# Patient Record
Sex: FEMALE | Race: WHITE | HISPANIC OR LATINO | URBAN - METROPOLITAN AREA
[De-identification: names, ages, dates, MRNs, and addresses within clinical notes are randomized per-mention and may not be internally consistent; named-entity substitution may affect disease eponyms.]

---

## 2022-08-29 ENCOUNTER — OFFICE VISIT (OUTPATIENT)
Dept: URBAN - METROPOLITAN AREA CLINIC 66 | Facility: CLINIC | Age: 87
End: 2022-08-29

## 2022-08-29 RX ORDER — HYDROCHLOROTHIAZIDE 12.5 MG/1
TABLET ORAL
Qty: 90 TABLET | Status: ACTIVE | COMMUNITY

## 2022-08-29 RX ORDER — SIMVASTATIN 20 MG/1
TOME UNA TABLETA TODOS LOS D AS TABLET, FILM COATED ORAL
Qty: 90 EACH | Refills: 0 | Status: ACTIVE | COMMUNITY

## 2022-08-29 RX ORDER — FUROSEMIDE 20 MG/1
TABLET ORAL
Qty: 36 TABLET | Status: ACTIVE | COMMUNITY

## 2022-08-29 RX ORDER — POTASSIUM CHLORIDE 1500 MG/1
TABLET, FILM COATED, EXTENDED RELEASE ORAL
Qty: 90 TABLET | Status: ACTIVE | COMMUNITY

## 2022-08-29 NOTE — HPI-MIGRATED HPI
Transition of Care : Patient evaluated due to constipation/abdominal distention. Was recently treated at [facility] and had CT scan that was found with a colon mass.  Has history of chronic constipation.  Denies nausea, vomits, dysphagia, odynophagia, heartburn, abdominal pain, diarrhea, GI bleeding or weight loss

## 2022-08-29 NOTE — EXAM-MIGRATED EXAMINATIONS
General Examination: Rectal: -> not examined    Extremities: -> normal extremity with no clubbing, cyanosis or edema   Neurologic: -> alert and oriented, normal exam with no motor or sensory deficits   Lymph nodes: ->    Skin: -> skin is warm and dry, with no rashes, good skin turgor and normal hair distribution, with no suspicious skin lesions   Heart: -> regular rate and rhythm without murmurs, gallops, clicks or rubs   Lungs: -> clear to auscultation bilaterally, with good air movement and no rales, rhonchi or wheezes   Breasts: ->    Abdomen: -> soft with good bowel sounds, nontender, and no masses or hepatosplenomegaly   General appearance: -> alert, pleasant, well-nourished and in no acute distress   Head: -> normocephalic, atraumatic   Eyes: -> pupils equal, round, reactive to light and accommodation, sclera anicteric   Ears: -> normal   Oral cavity: -> mucosa moist   Neck / thyroid: ->

## 2022-09-06 ENCOUNTER — TELEPHONE ENCOUNTER (OUTPATIENT)
Dept: URBAN - METROPOLITAN AREA CLINIC 68 | Facility: CLINIC | Age: 87
End: 2022-09-06

## 2022-09-26 ENCOUNTER — TELEPHONE ENCOUNTER (OUTPATIENT)
Dept: URBAN - METROPOLITAN AREA CLINIC 68 | Facility: CLINIC | Age: 87
End: 2022-09-26

## 2022-09-26 ENCOUNTER — OFFICE VISIT (OUTPATIENT)
Dept: URBAN - METROPOLITAN AREA CLINIC 66 | Facility: CLINIC | Age: 87
End: 2022-09-26

## 2022-09-26 RX ORDER — FUROSEMIDE 20 MG/1
TABLET ORAL
Qty: 36 TABLET | Status: ACTIVE | COMMUNITY

## 2022-09-26 RX ORDER — HYDROCHLOROTHIAZIDE 12.5 MG/1
TABLET ORAL
Qty: 90 TABLET | Status: ACTIVE | COMMUNITY

## 2022-09-26 RX ORDER — SIMVASTATIN 20 MG/1
TOME UNA TABLETA TODOS LOS D AS TABLET, FILM COATED ORAL
Qty: 90 EACH | Refills: 0 | Status: ACTIVE | COMMUNITY

## 2022-09-26 RX ORDER — POTASSIUM CHLORIDE 1500 MG/1
TABLET, FILM COATED, EXTENDED RELEASE ORAL
Qty: 90 TABLET | Status: ACTIVE | COMMUNITY

## 2022-09-26 NOTE — HPI-MIGRATED HPI
Transition of Care : Patient evaluated after having recent CT scan at [facility] reportd with possible colonic lesion.  Patient consented for video-audio encounter using [employer] Complaint ascencion. Currently having constipation problems.  Denies nausea, vomits, dysphagia, odynophagia, heartburn, abdominal pain, diarrhea,  GI bleeding or weight loss

## 2022-10-21 ENCOUNTER — OFFICE VISIT (OUTPATIENT)
Dept: URBAN - METROPOLITAN AREA CLINIC 68 | Facility: CLINIC | Age: 87
End: 2022-10-21

## 2022-10-21 ENCOUNTER — TELEPHONE ENCOUNTER (OUTPATIENT)
Dept: URBAN - METROPOLITAN AREA CLINIC 68 | Facility: CLINIC | Age: 87
End: 2022-10-21

## 2022-10-21 RX ORDER — POTASSIUM CHLORIDE 1500 MG/1
TABLET, FILM COATED, EXTENDED RELEASE ORAL
Qty: 90 TABLET | Status: ACTIVE | COMMUNITY

## 2022-10-21 RX ORDER — FUROSEMIDE 20 MG/1
TABLET ORAL
Qty: 36 TABLET | Status: ACTIVE | COMMUNITY

## 2022-10-21 RX ORDER — SIMVASTATIN 20 MG/1
TOME UNA TABLETA TODOS LOS D AS TABLET, FILM COATED ORAL
Qty: 90 EACH | Refills: 0 | Status: ACTIVE | COMMUNITY

## 2022-10-21 RX ORDER — HYDROCHLOROTHIAZIDE 12.5 MG/1
TABLET ORAL
Qty: 90 TABLET | Status: ACTIVE | COMMUNITY

## 2022-10-21 NOTE — HPI-MIGRATED HPI
Transition of Care : Patient evaluated due to constipation.  Patient and daughter consented for video-audio encounter using [email or web address] Complaint ascencion. Was recently treated at [facility] due to acute constipation. Was treated for volume depletion.  Referred continues with constipation problems and bloody mucosity.   Referred had negative CT scan compared to previous one.  Denies nausea, vomits, dysphagia, odynophagia, heartburn, abdominal pain, diarrhea, constipation GI bleeding or weight loss

## 2022-10-27 ENCOUNTER — TELEPHONE ENCOUNTER (OUTPATIENT)
Dept: URBAN - METROPOLITAN AREA CLINIC 68 | Facility: CLINIC | Age: 87
End: 2022-10-27

## 2022-11-10 ENCOUNTER — DASHBOARD ENCOUNTERS (OUTPATIENT)
Age: 87
End: 2022-11-10

## 2022-11-10 ENCOUNTER — OFFICE VISIT (OUTPATIENT)
Dept: URBAN - METROPOLITAN AREA CLINIC 68 | Facility: CLINIC | Age: 87
End: 2022-11-10

## 2022-11-10 RX ORDER — POTASSIUM CHLORIDE 1500 MG/1
TABLET, FILM COATED, EXTENDED RELEASE ORAL
Qty: 90 TABLET | Status: ACTIVE | COMMUNITY

## 2022-11-10 RX ORDER — FUROSEMIDE 20 MG/1
TABLET ORAL
Qty: 36 TABLET | Status: ACTIVE | COMMUNITY

## 2022-11-10 RX ORDER — HYDROCHLOROTHIAZIDE 12.5 MG/1
TABLET ORAL
Qty: 90 TABLET | Status: ACTIVE | COMMUNITY

## 2022-11-10 RX ORDER — SIMVASTATIN 20 MG/1
TOME UNA TABLETA TODOS LOS D AS TABLET, FILM COATED ORAL
Qty: 90 EACH | Refills: 0 | Status: ACTIVE | COMMUNITY

## 2022-11-10 NOTE — HPI-MIGRATED HPI
Transition of Care : Patient evaluated due to abnormal ct scan and constipation. Had f/u CT scan reported without major abnormalities.  Patient consented for video-audio encounter using [employer] Complaint ascencion. Denies nausea, vomits, dysphagia, odynophagia, heartburn, abdominal pain, diarrhea, constipation GI bleeding or weight loss